# Patient Record
Sex: FEMALE | Race: AMERICAN INDIAN OR ALASKA NATIVE
[De-identification: names, ages, dates, MRNs, and addresses within clinical notes are randomized per-mention and may not be internally consistent; named-entity substitution may affect disease eponyms.]

---
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
## 2017-10-14 NOTE — XRAY REPORT
FINAL REPORT



EXAM:  XR SPINE CERVICAL 2-3V



HISTORY:  NECK/BACK PAIN 



COMPARISONS:  None 



FINDINGS:  

Three views of the cervical spine 



Mild straightening of the cervical spine. Vertebral body heights

and intervertebral disc spaces are preserved. No fractures.

Prevertebral soft tissues are within normal limits. Incomplete

evaluation of the lung apices is unremarkable. 



IMPRESSION:  

Mild straightening of the cervical spine, which may be secondary

to positioning or muscular spasm. Otherwise unremarkable cervical

spine radiographs.

## 2019-07-27 ENCOUNTER — HOSPITAL ENCOUNTER (EMERGENCY)
Dept: HOSPITAL 5 - ED | Age: 44
Discharge: HOME | End: 2019-07-27
Payer: COMMERCIAL

## 2019-07-27 VITALS — DIASTOLIC BLOOD PRESSURE: 75 MMHG | SYSTOLIC BLOOD PRESSURE: 126 MMHG
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
## 2019-07-27 DIAGNOSIS — M51.36: ICD-10-CM

## 2019-07-27 DIAGNOSIS — Z79.899: ICD-10-CM
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
## 2019-07-27 DIAGNOSIS — S39.012A: ICD-10-CM

## 2019-07-27 DIAGNOSIS — S16.1XXA: Primary | ICD-10-CM
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
## 2019-07-27 PROCEDURE — 72125 CT NECK SPINE W/O DYE: CPT

## 2019-07-27 PROCEDURE — 81025 URINE PREGNANCY TEST: CPT

## 2019-07-27 PROCEDURE — 72128 CT CHEST SPINE W/O DYE: CPT

## 2019-07-27 PROCEDURE — 72131 CT LUMBAR SPINE W/O DYE: CPT

## 2019-07-27 PROCEDURE — 70450 CT HEAD/BRAIN W/O DYE: CPT
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
